# Patient Record
Sex: FEMALE | Race: WHITE
[De-identification: names, ages, dates, MRNs, and addresses within clinical notes are randomized per-mention and may not be internally consistent; named-entity substitution may affect disease eponyms.]

---

## 2014-08-31 VITALS
DIASTOLIC BLOOD PRESSURE: 86 MMHG | SYSTOLIC BLOOD PRESSURE: 120 MMHG | DIASTOLIC BLOOD PRESSURE: 86 MMHG | DIASTOLIC BLOOD PRESSURE: 86 MMHG | SYSTOLIC BLOOD PRESSURE: 120 MMHG | DIASTOLIC BLOOD PRESSURE: 86 MMHG | SYSTOLIC BLOOD PRESSURE: 120 MMHG | DIASTOLIC BLOOD PRESSURE: 86 MMHG | SYSTOLIC BLOOD PRESSURE: 120 MMHG | DIASTOLIC BLOOD PRESSURE: 86 MMHG | SYSTOLIC BLOOD PRESSURE: 120 MMHG | SYSTOLIC BLOOD PRESSURE: 120 MMHG | SYSTOLIC BLOOD PRESSURE: 120 MMHG | DIASTOLIC BLOOD PRESSURE: 86 MMHG | SYSTOLIC BLOOD PRESSURE: 120 MMHG | DIASTOLIC BLOOD PRESSURE: 86 MMHG

## 2017-09-18 ENCOUNTER — HOSPITAL ENCOUNTER (OUTPATIENT)
Dept: HOSPITAL 44 - RAD | Age: 67
End: 2017-09-18
Attending: PHYSICIAN ASSISTANT
Payer: COMMERCIAL

## 2017-09-18 DIAGNOSIS — M25.511: ICD-10-CM

## 2017-09-18 DIAGNOSIS — G89.29: Primary | ICD-10-CM

## 2017-09-18 DIAGNOSIS — M25.512: ICD-10-CM

## 2017-09-18 NOTE — DIAGNOSTIC IMAGING REPORT
[facility]

[address] [facility].[address] [address]

 

 

 

 

Report Submission Date: Sep 18, 2017 12:22:33 PM CDT

Patient       Study

Name:   JAMIR TRAN       Date:   Sep 18, 2017 11:59:18 AM CDT

MRN:   [id number]       Modality Type:   CR

Gender:   F       Description:   SHOULDER

:   48       Institution:   [facility]

Physician:   NICOLE PEREZ

         

 

 

Examination: Plain film shoulders 



History: Bilateral shoulder discomfort 



Comparison exams: None provided 



Findings: 2 views of the right and left shoulders demonstrate normal cortical 
margins.  No evidence for fracture or dislocation. Early acromioclavicular 
joint degenerative changes. No soft tissue abnormality. 



Impression: Acromioclavicular joint degenerative changes. No acute osseous 
process. If symptoms persist, consider obtaining MRI to further evaluate.

 

Electronically signed on Sep 18, 2017 12:22:33 PM CDT by:

Bryant LANDAVERDE

## 2017-10-02 ENCOUNTER — HOSPITAL ENCOUNTER (OUTPATIENT)
Dept: HOSPITAL 44 - RT | Age: 67
End: 2017-10-02
Attending: FAMILY MEDICINE
Payer: COMMERCIAL

## 2017-10-02 DIAGNOSIS — R06.09: Primary | ICD-10-CM

## 2017-10-02 PROCEDURE — 71020: CPT

## 2017-10-02 NOTE — DIAGNOSTIC IMAGING REPORT
ENA TRUJILLO 

[facility]

[address] [facility] [address]

[place], [place]. [address]

 

 

 

 

Report Submission Date: Oct 2, 2017 11:37:07 AM CDT

Patient       Study

Name:   JAMIR TRAN       Date:   Oct 2, 2017 10:58:32 AM CDT

MRN:   [id number]       Modality Type:   CR

Gender:   F       Description:   CHEST

:   48       Institution:   [facility]

Physician:   ENA TRUJILLO

         

 

 

Examination: PA and lateral chest. 



History: Evaluate lung fields. 



The comparison exam: None provided 



Findings: PA lateral chest demonstrate a normal cardiac and mediastinal 
silhouette. No focal infiltrate.  No blunting of the costophrenic margins.  
Osseous structures are appropriate for age. 



Impression: No acute pulmonary process.

 

Electronically signed on Oct 2, 2017 11:37:07 AM CDT by:

Braynt LANDAVERDE

## 2017-11-14 ENCOUNTER — HOSPITAL ENCOUNTER (OUTPATIENT)
Dept: HOSPITAL 44 - CARD | Age: 67
End: 2017-11-14
Attending: INTERNAL MEDICINE
Payer: COMMERCIAL

## 2017-11-14 DIAGNOSIS — E66.9: ICD-10-CM

## 2017-11-14 DIAGNOSIS — R07.9: ICD-10-CM

## 2017-11-14 DIAGNOSIS — G47.30: ICD-10-CM

## 2017-11-14 DIAGNOSIS — E78.5: ICD-10-CM

## 2017-11-14 DIAGNOSIS — R06.00: Primary | ICD-10-CM

## 2017-11-14 PROCEDURE — 99213 OFFICE O/P EST LOW 20 MIN: CPT

## 2017-11-17 ENCOUNTER — HOSPITAL ENCOUNTER (OUTPATIENT)
Dept: HOSPITAL 44 - LAB | Age: 67
End: 2017-11-17
Attending: INTERNAL MEDICINE
Payer: COMMERCIAL

## 2017-11-17 DIAGNOSIS — R07.9: Primary | ICD-10-CM

## 2017-11-17 PROCEDURE — 80061 LIPID PANEL: CPT

## 2017-11-17 PROCEDURE — 36415 COLL VENOUS BLD VENIPUNCTURE: CPT

## 2018-01-05 ENCOUNTER — HOSPITAL ENCOUNTER (OUTPATIENT)
Dept: HOSPITAL 44 - LAB | Age: 68
End: 2018-01-05
Attending: PHYSICIAN ASSISTANT
Payer: COMMERCIAL

## 2018-01-05 DIAGNOSIS — R53.83: Primary | ICD-10-CM

## 2018-01-05 LAB
BASOPHILS NFR BLD: 0.7 % (ref 0–1.5)
EOSINOPHIL NFR BLD: 1 % (ref 0–6.8)
MCH RBC QN AUTO: 32.2 PG (ref 28–34)
MCV RBC AUTO: 95.6 FL (ref 80–100)
MONOCYTES %: 5 % (ref 0–11)
NEUTROPHILS #: 5.3 # K/UL (ref 1.4–7.7)

## 2018-01-05 PROCEDURE — 85025 COMPLETE CBC W/AUTO DIFF WBC: CPT

## 2018-01-05 PROCEDURE — 36415 COLL VENOUS BLD VENIPUNCTURE: CPT

## 2018-02-15 ENCOUNTER — HOSPITAL ENCOUNTER (OUTPATIENT)
Dept: HOSPITAL 44 - LAB | Age: 68
End: 2018-02-15
Attending: FAMILY MEDICINE
Payer: COMMERCIAL

## 2018-02-15 DIAGNOSIS — R06.02: Primary | ICD-10-CM

## 2018-02-15 DIAGNOSIS — R25.1: ICD-10-CM

## 2018-02-15 PROCEDURE — 71046 X-RAY EXAM CHEST 2 VIEWS: CPT

## 2018-02-15 NOTE — DIAGNOSTIC IMAGING REPORT
ENA TRUJILLO 

[facility]

[address] [facility] [address] [address]

 

 

 

 

Report Submission Date: Feb 15, 2018 10:20:30 AM CST

Patient       Study

Name:   JAMIR TRAN       Date:   Feb 15, 2018 9:43:57 AM CST

MRN:   [id number]       Modality Type:   CR

Gender:   F       Description:   CHEST

:   48       Institution:   [facility]

Physician:   ENA TRUJILLO

     Accession:    [id number]

 

 

Examination: PA and lateral chest. 



History: COUGH, WHEEZING, SHORTNESS OF AIR ON EXERTION X 1 MONTH (Hx) / 
SHORTNESS OF BREATH (DICOM Hx) / SHORTNESS OF BREATH (Pt comments) 



Comparison exam: 2017 



Findings: PA lateral chest demonstrate a normal cardiac and mediastinal 
silhouette. Mildly elevated right hemidiaphragm. No focal infiltrate.  No 
blunting of the costophrenic margins.  Osseous structures are appropriate for 
age. 



Impression: Stable examination. No acute appearing pulmonary process.

 

Electronically signed on Feb 15, 2018 10:20:30 AM CST by:

Bryant LANDAVERDE

## 2018-03-06 ENCOUNTER — HOSPITAL ENCOUNTER (OUTPATIENT)
Dept: HOSPITAL 44 - RT | Age: 68
End: 2018-03-06
Attending: FAMILY MEDICINE
Payer: COMMERCIAL

## 2018-03-06 DIAGNOSIS — R06.02: Primary | ICD-10-CM

## 2018-03-06 PROCEDURE — 80053 COMPREHEN METABOLIC PANEL: CPT

## 2018-03-06 PROCEDURE — 94060 EVALUATION OF WHEEZING: CPT

## 2018-03-06 PROCEDURE — 84443 ASSAY THYROID STIM HORMONE: CPT

## 2018-03-06 PROCEDURE — 36415 COLL VENOUS BLD VENIPUNCTURE: CPT

## 2018-03-09 ENCOUNTER — HOSPITAL ENCOUNTER (OUTPATIENT)
Dept: HOSPITAL 44 - PULMONARY | Age: 68
End: 2018-03-09
Attending: INTERNAL MEDICINE
Payer: COMMERCIAL

## 2018-03-09 DIAGNOSIS — R06.00: Primary | ICD-10-CM

## 2018-03-09 DIAGNOSIS — E78.5: ICD-10-CM

## 2018-03-09 DIAGNOSIS — J44.9: ICD-10-CM

## 2018-03-09 DIAGNOSIS — K21.0: ICD-10-CM

## 2018-03-09 DIAGNOSIS — Z96.659: ICD-10-CM

## 2018-03-09 DIAGNOSIS — G47.33: ICD-10-CM

## 2018-03-09 DIAGNOSIS — M19.90: ICD-10-CM

## 2018-03-09 DIAGNOSIS — E66.01: ICD-10-CM

## 2018-03-09 PROCEDURE — 99213 OFFICE O/P EST LOW 20 MIN: CPT

## 2018-03-09 PROCEDURE — 99214 OFFICE O/P EST MOD 30 MIN: CPT

## 2018-03-19 NOTE — CONSULTATION REPORT
PRIMARY CARE PROVIDER: 

Dr. Stephanie Kilpatrick



CONSULTING PHYSICIAN: 

Delmi Reyes MD



CHIEF COMPLAINT:

"I am short of breath especially with exertion."



SIGNIFICANT PROBLEM LIST:

1.    Obstructive sleep apnea, on CPAP.    DME is [facility]. 

2.    Hyperlipidemia. 

3.    Gastroesophageal reflux disease. 

4.    Degenerative joint disease (DJD).

5.    Knee replacement. 

6.    History of anxiety. 

7.    Morbid obesity.  BMI of 40.9.



HISTORY OF PRESENT ILLNESS: 

This is a 67-year-old female who states she gets short of breath especially on 
exertion.  In November of 2017, patient was seen by Cardiology for dyspnea on 
exertion.  She was thought to be a low to moderate risk for cardiac disease and 
follow up was p.r.n.   On December 24, 2017, patient developed more shortness 
of breath.   By December 30th, patient also had a cough and presented to the 
emergency department where she was prescribed prednisone and albuterol which 
she felt did not help her very much.  She also applied for short-term 
disability on December 27, 2017.  



She states she does use ProAir and at times and it helps her.  Regarding her 
BiPAP, she uses it nightly usually about 7 hours per night.  She has no home 
oxygen.  No home nebulizer.   She does not remember when her last sleep study 
was done.   She denies chest pain.  No PND.  No pedal edema.  She has 
occasional postnasal drip.   She is usually not on prednisone and has not been 
on antibiotics for her lungs recently.   She has never had hemoptysis.  There 
are no fever, chills, or sweats.  Her weight has been increasing.  Her appetite 
is good.  Her energy level is marginal.  There is no previous history of asthma
, pneumonias, or DVTs.  



Tobacco use:   She quit in 2014.   Prior to that, she smoked 1 pack a day for 
30 years.  She states she is exposed to second-hand smoke in that her  
continues to use tobacco.  



Review of old records that were not available at the time that I saw the 
patient shows that  [facility] [employer] [facility] supplies patient's BiPAP.  Her 
inspiratory pressure is 17 cm of water.  Her expiratory pressure is 12 cm of 
water and she has excellent compliance.   [facility]'s phone number is [phone]
[phone].  



REVIEW OF SYSTEMS: 

Please see HPI for pertinent review of systems.  Other pertinent review of 
systems includes occasional dysuria and treatment for UTI and severe foot pain 
related to the use of ciprofloxacin.  Please also see [facility] patient intake record.



MEDICATIONS: 

1.     ProAir Inhaler every 3 to 4 hours p.r.n. shortness of breath. 

2.     Multivitamin 1 tablet daily. 

3.     Fish oil with vitamin D 1 tablet daily. 

4.     Ibuprofen 800 mg p.r.n. at bedtime. 

5.     Cephalexin 250 mg t.i.d.

6.     Loperamide 4 mg daily. 



ALLERGIES: 

1.    Sulfa:   Rash.  

2.    Celebrex:   Unknown.

3.    Ciprofloxacin:   Adverse reaction of foot pain. 



SOCIAL HISTORY: 

She lives with her .  Rare use of alcohol.  



FAMILY HISTORY: 

1.    Cancer.

2.    Vascular heart disease. 

3.    Lung disease. 



PHYSICAL EXAMINATION: 

Vital Signs:  BP:  120/74, P: 77, R: 20, T:  96.5, oxygen saturation 93%.  
Height:  5 feet 7 inches.  Weight:  261 pounds.  BMI is 40.9.

General:   This is a well-developed obese female in no acute distress speaking 
in full sentences. 

HEENT:   Pupils are equal and reactive to light.  

Oropharynx:   Clear.  No thrush.   No erythema. 

Neck:  Supple.  No lymphadenopathy.  No JVD.

Lungs:  Good bilateral air excursion.   No wheezes.  No crackles.  No increased 
tactile fremitus.  

Cardiac:  Rate and rhythm are regular, S1 and S2 without S3, S4.   Without 
murmur, rubs, or gallops.  

Abdomen:  Obese.  Soft and nontender.  Difficult to assess organomegaly.  

Extremities:  No cyanosis, clubbing, or edema. 

Neurologic:  Alert and oriented x3.  Grossly nonfocal.  

Skin:  No skin rashes or obvious skin ulcers.    No lymphadenopathy.



IMAGING:

All imaging independently reviewed by me personally. 



1.    Six-minute walk test on room air on March 9, 2018.   Oxygen saturation 
varied from 93% down to 89%.   Pulse varied from 90 to 120.  Per the RN, 
patient became short of breath approximately 2 minutes into the walk but was 
able to complete the walk at a moderate pace and was able to speak in complete 
sentences.  

2.     Pulmonary function testing on March 6, 2018.   FVC was 3.06, 92% of 
predicted; FEV1 was 2.16, 82% of predicted; FEV1/FVC was 0.71.   Interpretation
:   Mild COPD, GOLD Class 1  (Please note, waveform definitely shows prolonged 
exhalation consistent with mild COPD).

3.     Chest x-ray on February 15, 2018.   No infiltrates.  

4.     Stress echo on May 6, 2014.   No ischemic heart disease. 



LABORATORIES:

1.     CBC on January 5, 2018.   Hemoglobin 15, hematocrit 44, white count 7.9, 
platelets 214,000.

2.      Chemistry on February 15, 2018.    Sodium 145, potassium 4.0, chloride 
106, bicarbonate 23, BUN 18, creatinine 0.6, glucose of 78. 



ASSESSMENT AND PLAN:

PROBLEM  #1:   Dyspnea on exertion, multifactorial, including chronic 
obstructive pulmonary disease (COPD), GOLD Class 1 and morbid obesity.



PLAN: 

1.    Initiate Symbicort 160/4.5  two puffs b.i.d.

2.    Continue albuterol 2 puffs every 3 to 4 hours p.r.n. shortness of breath. 

3.    Obtain most recent stress test results from the [facility] [place].  

4.    Repeat a six-minute walk test on next visit.



PROBLEM  #2:   Morbid obesity, BMI is 40.9.

Patient and I have discussed her weight and how that can affect her breathing.  



PLAN: 

Patient acknowledges her weight problem and will try to lose some weight. 



PROBLEM  #3:   Obstructive sleep apnea.  

Per patient report and information from her DME at [facility], she has very good 
BiPAP compliance.  Her BiPAP settings are 17 over 12.  



PLAN: 

1.    Obtain most recent sleep study result.

2.    Continue to use BiPAP at the current setting.



PROBLEM  #4:   Preventative pulmonary disease. 

Patient categorically states she is refusing the flu shot.  



PLAN: 

1.   Discuss with the patient on the next visit if she would accept the 
Pneumovax.

2.   Return to clinic in 1 month.





cc:   Dr. Stephanie LANDAVERDE

## 2018-09-20 ENCOUNTER — HOSPITAL ENCOUNTER (EMERGENCY)
Dept: HOSPITAL 44 - ED | Age: 68
Discharge: HOME | End: 2018-09-20
Payer: MEDICARE

## 2018-09-20 VITALS
DIASTOLIC BLOOD PRESSURE: 72 MMHG | SYSTOLIC BLOOD PRESSURE: 148 MMHG | DIASTOLIC BLOOD PRESSURE: 72 MMHG | SYSTOLIC BLOOD PRESSURE: 148 MMHG

## 2018-09-20 DIAGNOSIS — N39.0: Primary | ICD-10-CM

## 2018-09-20 DIAGNOSIS — R11.0: ICD-10-CM

## 2018-09-20 LAB
APPEARANCE UR: (no result)
BASOPHILS NFR BLD: 0.8 % (ref 0–1.5)
COLOR,URINE: YELLOW
EGFR (NON-AFRICAN): > 60
EOSINOPHIL NFR BLD: 1.5 % (ref 0–6.8)
LABORATORY COMMENT REPORT: 1.32 THOU/UL (ref 0.6–4)
MCH.: 30.7 PG (ref 28–34)
MCV RBC: 92.2 FL (ref 80–100)
MONOCYTE ABS #: 0.34 THOU/UL (ref 0–0.9)
MONOCYTES NFR BLD: 6.9 % (ref 0–11)
PH UR STRIP: 6 [PH] (ref 5–8)
PLATELET # BLD: 167 THOU/UL (ref 130–400)
RBC UR QL: NEGATIVE
UROBILINOGEN URINE: 0.2 EU (ref 0.2–1)

## 2018-09-20 PROCEDURE — 81002 URINALYSIS NONAUTO W/O SCOPE: CPT

## 2018-09-20 PROCEDURE — S1016 NON-PVC INTRAVENOUS ADMINIST: HCPCS

## 2018-09-20 PROCEDURE — 96365 THER/PROPH/DIAG IV INF INIT: CPT

## 2018-09-20 PROCEDURE — 87186 SC STD MICRODIL/AGAR DIL: CPT

## 2018-09-20 PROCEDURE — 99284 EMERGENCY DEPT VISIT MOD MDM: CPT

## 2018-09-20 PROCEDURE — 87086 URINE CULTURE/COLONY COUNT: CPT

## 2018-09-20 PROCEDURE — 96375 TX/PRO/DX INJ NEW DRUG ADDON: CPT

## 2018-09-20 PROCEDURE — 80053 COMPREHEN METABOLIC PANEL: CPT

## 2018-09-20 PROCEDURE — 85025 COMPLETE CBC W/AUTO DIFF WBC: CPT

## 2018-09-20 NOTE — ED PHYSICIAN DOCUMENTATION
General Adult





- HISTORIAN


Historian: patient





- HPI


Stated Complaint: nausea and dizziness


Chief Complaint: Nausea,Vomiting,Diarrhea


Onset: days ago (3)


Timing: still present


Severity: mild


Further Comments: yes (She reports about 3 days ago she started with a mild 

headache (noting her CPAP mask is loose and at times she has this headache when 

the mask is loose) she notes she was then mildly nauseated with headache 

yesterday and then today is worse with dizziness. She denies any chest pain, no 

weakness, no other complaints. No sick contacts)


Last known Well Code/Unknown Code: Unknown





- ROS


CONST: denies: fever, recent illness, weakness


EYES/ENT: nasal congestion.  denies: problems with vision, sore throat, nasal 

drainage


GI/: vomiting, nausea, diarrhea (moderate amount today - she has chonic 

diarrhea she did not take her meds yesterday or today for this issue ).  denies:

abdominal pain, problems urinating


MS/SKIN/LYMPH: denies: rash





- PAST HX


Past History: other (diarrhea and urinary frequency )


Immunizations: UTD


Allergies/Adverse Reactions: 


                                    Allergies











Allergy/AdvReac Type Severity Reaction Status Date / Time


 


Sulfa (Sulfonamide Allergy Unknown  Verified 09/20/18 11:47





Antibiotics)     














Home Medications: 


                                Ambulatory Orders











 Medication  Instructions  Recorded


 


Loperamide HCl [Immodium] 2 mg PO BID 09/20/18


 


Mirabegron [Myrbetriq] 50 mg PO D 09/20/18


 


Trimethoprim 100 mg PO D 09/20/18














- SOCIAL HX


Smoking History: non-smoker


Alcohol Use: none


Drug Use: none





- FAMILY HX


Family History: No





- VITAL SIGNS


Vital Signs: 





                                   Vital Signs











Temp Pulse Resp BP Pulse Ox


 


          120/86    


 


          08/31/14 14:52   














- REVIEWED ASSESSMENTS


Nursing Assessment  Reviewed: Yes


Vitals Reviewed: Yes





Progress





- Progress


Progress: 





1245: reports she is still experiencing nausea and she wants to just go home. 

Further treatments offered and she is refusing just wanting to go home DG 





General Adult Physical Exam





- PHYSICAL EXAM


GENERAL APPEARANCE: no distress


EENT: eye inspection normal, ENT inspection normal, pharynx normal, dry mucous 

membranes


NECK: normal inspection


RESPIRATORY: no resp distress, chest non-tender, breath sounds normal


CVS: reg rate & rhythm, heart sounds normal, equal pulses, no murmur


ABDOMEN: soft, normal bowel sounds, no distension, non-tender


BACK: normal inspection, no CVA tenderness


SKIN: warm/dry


EXTREMITIES: non-tender, normal range of motion, no evidence of injury, no edema


NEURO: oriented X3





Discharge


Clincal Impression: 


UTI (urinary tract infection)


Qualifiers:


 Urinary tract infection type: site unspecified Hematuria presence: without 

hematuria Qualified Code(s): N39.0 - Urinary tract infection, site not specified





Referrals: 


Stephanie Kilpatrick MD [Primary Care Provider] - 2 Days


Additional Instructions: 


1. Increase fluids


2. Notify PCP due to frequent UTI's


3. Macrobid 100 mg take 1 by mouth twice daily x 10 days


4. Zofran 4 mg take 1 by mouth every 8 hours as needed for nausea


5. Return to ER for any concerns 


Condition: Stable


Disposition: 01 HOME, SELF-CARE


Decision to Admit: NO


Date of Decison to Admit: 09/20/18


Decision Time: 12:56

## 2019-09-30 ENCOUNTER — HOSPITAL ENCOUNTER (OUTPATIENT)
Dept: HOSPITAL 44 - RAD | Age: 69
End: 2019-09-30
Attending: NURSE PRACTITIONER
Payer: MEDICARE

## 2019-09-30 DIAGNOSIS — M79.621: ICD-10-CM

## 2019-09-30 DIAGNOSIS — M79.641: Primary | ICD-10-CM

## 2019-09-30 PROCEDURE — 73130 X-RAY EXAM OF HAND: CPT

## 2019-09-30 PROCEDURE — 71110 X-RAY EXAM RIBS BIL 3 VIEWS: CPT

## 2019-09-30 NOTE — DIAGNOSTIC IMAGING REPORT
KRIS KENT 

[facility]

[address] [facility] [address]

[place], [place]. [address]

 

 

 

 

Report Submission Date: Sep 30, 2019 2:41:08 PM CDT

Patient       Study

Name:   JAMIR TRAN       Date:   Sep 30, 2019 1:54:37 PM CDT

MRN:   [id number]       Modality Type:   DX

Gender:   F       Description:   RIBS BILAT 3 VIEWS

:   48       Institution:   [facility]

Physician:   KRIS KENT

     Accession:    [id number]

 

 

Exam bilateral ribs.  



History:  Right axillary pain after fall.  



AP and oblique views of the thorax are submitted.  



Acute fractures of the right 4th, 5th, 6th ribs are noted.  No bony abnormality 
to the left hemithorax is seen.  No pleural or periosteal reaction is seen.  No 
pneumothorax is identified.  



Impression:

Acute fractures of the right 4th, 5th and 6th ribs.

 

Electronically signed on Sep 30, 2019 2:41:08 PM CDT by:

Nasim LANDAVERDE

## 2019-09-30 NOTE — DIAGNOSTIC IMAGING REPORT
KRIS KENT 

[facility]

[address] [facility] [address]

[place], [place]. [address]

 

 

 

 

Report Submission Date: Sep 30, 2019 2:34:42 PM CDT

Patient       Study

Name:   JAMIR TRAN       Date:   Sep 30, 2019 1:54:37 PM CDT

MRN:   [id number]       Modality Type:   DX

Gender:   F       Description:   HAND 3 VIEWS OR MORE

:   48       Institution:   [facility]

Physician:   KRIS KENT

     Accession:    [id number]

 

 

Exam: Right hand.  



History:  Pain at 3rd metacarpal after fall.  



PA, lateral and oblique view of the right hand are submitted.  



No signs of acute fracture or dislocation is seen.  No bony erosions are seen.  
No soft tissue abnormalities identified.  



Impression:

No bony abnormality.

 

Electronically signed on Sep 30, 2019 2:34:42 PM CDT by:

Nasim LANDAVERDE